# Patient Record
Sex: MALE | Race: WHITE
[De-identification: names, ages, dates, MRNs, and addresses within clinical notes are randomized per-mention and may not be internally consistent; named-entity substitution may affect disease eponyms.]

---

## 2018-06-05 ENCOUNTER — HOSPITAL ENCOUNTER (EMERGENCY)
Dept: HOSPITAL 62 - ER | Age: 23
Discharge: HOME | End: 2018-06-05
Payer: OTHER GOVERNMENT

## 2018-06-05 VITALS — SYSTOLIC BLOOD PRESSURE: 108 MMHG | DIASTOLIC BLOOD PRESSURE: 80 MMHG

## 2018-06-05 DIAGNOSIS — F17.200: ICD-10-CM

## 2018-06-05 DIAGNOSIS — S46.212A: Primary | ICD-10-CM
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
## 2018-06-05 PROCEDURE — 73060 X-RAY EXAM OF HUMERUS: CPT

## 2018-06-05 PROCEDURE — 96372 THER/PROPH/DIAG INJ SC/IM: CPT

## 2018-06-05 PROCEDURE — 99283 EMERGENCY DEPT VISIT LOW MDM: CPT

## 2018-06-05 PROCEDURE — 73080 X-RAY EXAM OF ELBOW: CPT

## 2018-06-05 NOTE — RADIOLOGY REPORT (SQ)
EXAM DESCRIPTION:  HUMERUS LEFT



COMPLETED DATE/TIME:  6/5/2018 4:48 pm



REASON FOR STUDY:  left humerus pain after fall



COMPARISON:  None.



NUMBER OF VIEWS:  Two views.



TECHNIQUE:  Two radiographic images were acquired of the left humerus to include elbow and shoulder i
n at least one projection.



LIMITATIONS:  None.



FINDINGS:  MINERALIZATION: Normal.

BONES: No acute fracture or dislocation.  No worrisome bone lesions.

SOFT TISSUES: No obvious swelling or foreign body.

OTHER: No other significant finding.



IMPRESSION:  NEGATIVE STUDY OF THE LEFT HUMERUS. NO RADIOGRAPHIC EVIDENCE OF ACUTE INJURY.



TECHNICAL DOCUMENTATION:  JOB ID:  [id number]

 2011 [employer]- All Rights Reserved



Reading location - IP/workstation name: [facility]-[email or web address]

## 2018-06-05 NOTE — ER DOCUMENT REPORT
HPI





- HPI


Pain Level: 5


Context: 





Patient is a 22-year-old male who presents emergency department with chief 

complaint of left arm injury.  Patient [place] on an outstretched hand with a pop and pain at the 

distal end of the left bicep.  Patient states that he definitely has pain with 

arm flexion and extension.  He denies any pain medication prior to arrival.  

Denies any numbness or tingling distal to his elbow





- DERM


Skin Color: Normal, Pink





Past Medical History





- Social History


Smoking Status: Current Every Day Smoker


Chew tobacco use (# tins/day): No


Frequency of alcohol use: daily


Drug Abuse: Marijuana


Family History: Reviewed & Not Pertinent


Patient has suicidal ideation: No


Patient has homicidal ideation: No


Renal/ Medical History: Denies: Hx Peritoneal Dialysis





Vertical Provider Document





- CONSTITUTIONAL


Agree With Documented VS: Yes


Notes: 


PHYSICAL EXAM


GENERAL: Alert, interacts well. 


EXTREMITIES: No edema, radial pulses 2/4 bilaterally. No cyanosis. Loss of left 

biceps muscle tone and shape, tender to palpation over the left mid arm and 

antecubital fossa, Neurovascular intact distally


NEUROLOGICAL: Alert and oriented x4. Normal speech.


PSYCH: Normal affect, normal mood.


SKIN: Warm, dry, normal turgor. No rashes or lesions noted.














- INFECTION CONTROL


TRAVEL OUTSIDE OF THE [place] IN LAST 30 DAYS: No





Course





- Re-evaluation


Re-evalutation: 





06/05/18 18:04


Patient is a 22-year-old male who is hemodynamically stable, no acute distress 

afebrile.  Presentation is consistent with a biceps tendon rupture.  there is 

no no evidence of a septic joint, gout flare, dislocation, or fracture on exam 

and imaging. Vitals wnl. At this time, I do not see an indication for labs or 

further imaging. Will discharge with conservative measures, return precautions, 

and follow-up recommendations.





- Vital Signs


Vital signs: 


 











Temp Pulse Resp BP Pulse Ox


 


 98.3 F   101 H  22 H  140/72 H  96 


 


 06/05/18 16:11  06/05/18 16:11  06/05/18 16:11  06/05/18 16:11  06/05/18 16:11














- Diagnostic Test


Radiology reviewed: Image reviewed, Reports reviewed





Procedures





- Immobilization


  ** Left Arm


Pre-Proc Neuro Vasc Exam: Normal


Immobilizer type: Ace wrap, Sling


Performed by: PCT


Post-Proc Neuro Vasc Exam: Normal





Discharge





- Discharge


Clinical Impression: 


 Biceps tendon tear





Condition: Good


Disposition: HOME, SELF-CARE


Instructions:  Tendon Laceration Referral (OM)


Additional Instructions: 


Please utilize the ace wrap to immobilize your bicep, follow up with ortho


Prescriptions: 


Docusate Sodium [Colace] 100 mg PO BID #20 capsule


Oxycodone HCl/Acetaminophen [Percocet 5-325 mg Tablet] 1 - 2 tab PO Q4H PRN #25 

tablet


 PRN Reason: 


Forms:  Return to Work


Referrals: 


ALIDA PATHAK DO [ACTIVE STAFF] - Follow up tomorrow

## 2018-06-05 NOTE — RADIOLOGY REPORT (SQ)
EXAM DESCRIPTION:  ELBOW LEFT OVER 2 VIEWS



COMPLETED DATE/TIME:  6/5/2018 4:20 pm



REASON FOR STUDY:  Pain after fall



COMPARISON:  None.



NUMBER OF VIEWS:  Four views.



TECHNIQUE:  AP, lateral, and both oblique radiographic images acquired of the left elbow.



LIMITATIONS:  None.



FINDINGS:  MINERALIZATION: Normal.

BONES: No acute fracture or dislocation.  No worrisome bone lesions.

JOINT: No effusion.

SOFT TISSUES: No soft tissue swelling.  No foreign body.

OTHER: No other significant finding.



IMPRESSION:  NEGATIVE STUDY OF THE LEFT ELBOW. NO RADIOGRAPHIC EVIDENCE OF ACUTE INJURY.



TECHNICAL DOCUMENTATION:  JOB ID:  [id number]

 2011 [employer]- All Rights Reserved



Reading location - IP/workstation name: [email or web address]

## 2018-06-05 NOTE — ER DOCUMENT REPORT
ED Medical Screen (RME)





- General


Chief Complaint: Elbow Injury


Stated Complaint: FALL/LEFT ELBOW PAIN


Time Seen by Provider: 06/05/18 16:18


Notes: 





RAPID MEDICAL EVALUATION DISCLOSURE


I have seen this patient as part of a Rapid Medical Evaluation and, if 

applicable, placed any initially appropriate orders. The patient will be seen 

and fully evaluated, including a full history and physical exam, by a provider (

in Main ED or Fast Track) when a room becomes available.





------------------------------------------------------------------





22-year-old male here with complaints of left arm and elbow pain that started 

just prior to arrival after he was playing football and fell onto his left 

elbow and "heard a pop".  EMS wanted to take him to [facility] 

however he wanted to come here instead.  Pain is worse with movement.  Pain is 

improved with minimizing movement.  He is taken to ibuprofen with minimal 

relief.  He denies any numbness tingling weakness.





EXAM


Loss of left biceps muscle tone and shape


TTP of mid left arm


Neurovascular intact distally








TRAVEL OUTSIDE OF THE [place] IN LAST 30 DAYS: No





- Related Data


Allergies/Adverse Reactions: 


 





No Known Allergies Allergy (Unverified 03/29/11 13:37)


 











Physical Exam





- Vital signs


Vitals: 





 











Temp Pulse Resp BP Pulse Ox


 


 98.3 F   101 H  22 H  140/72 H  96 


 


 06/05/18 16:11  06/05/18 16:11  06/05/18 16:11  06/05/18 16:11  06/05/18 16:11














Course





- Vital Signs


Vital signs: 





 











Temp Pulse Resp BP Pulse Ox


 


 98.3 F   101 H  22 H  140/72 H  96 


 


 06/05/18 16:11  06/05/18 16:11  06/05/18 16:11  06/05/18 16:11  06/05/18 16:11

## 2018-07-29 ENCOUNTER — HOSPITAL ENCOUNTER (EMERGENCY)
Dept: HOSPITAL 62 - ER | Age: 23
Discharge: HOME | End: 2018-07-29
Payer: SELF-PAY

## 2018-07-29 VITALS — DIASTOLIC BLOOD PRESSURE: 77 MMHG | SYSTOLIC BLOOD PRESSURE: 133 MMHG

## 2018-07-29 DIAGNOSIS — F17.210: ICD-10-CM
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
## 2018-07-29 DIAGNOSIS — Z71.6: ICD-10-CM

## 2018-07-29 DIAGNOSIS — Z23: ICD-10-CM

## 2018-07-29 DIAGNOSIS — S01.81XA: Primary | ICD-10-CM

## 2018-07-29 PROCEDURE — 90715 TDAP VACCINE 7 YRS/> IM: CPT

## 2018-07-29 PROCEDURE — 99406 BEHAV CHNG SMOKING 3-10 MIN: CPT

## 2018-07-29 PROCEDURE — 99282 EMERGENCY DEPT VISIT SF MDM: CPT

## 2018-07-29 PROCEDURE — 90471 IMMUNIZATION ADMIN: CPT

## 2018-07-29 NOTE — ER DOCUMENT REPORT
HPI





- HPI


Patient complains to provider of: Laceration to chin


Onset: Other - Saturday morning at 330


Onset/Duration: Persistent


Quality of pain: Achy


Severity: Moderate


Pain Level: 2


Associated Symptoms: Other - Laceration to chin


Exacerbated by: Denies


Relieved by: Denies


Similar symptoms previously: No


Recently seen / treated by doctor: No





- CONSTITUTIONAL


Constitutional: DENIES: Fever, Chills





- EENT


EENT: DENIES: Sore Throat, Ear Pain, Nasal Drainage-Clear, Nasal Drainage-

Purulent, Congestion, Eye problems





- NEURO


Neurology: DENIES: Headache, Weakness, Vision blurred, Dizzinesss / Vertigo





- CARDIOVASCULAR


Cardiovascular: DENIES: Chest pain





- RESPIRATORY


Respiratory: DENIES: Trouble Breathing, Coughing





- URINARY


Urinary: DENIES: Dysuria, Urgency, Frequency





- REPRODUCTIVE


Reproductive: DENIES: Pregnant:, Postmenopausal, Abnormal bleeding / discharge





- MUSCULOSKELETAL


Musculoskeletal: DENIES: Extremity pain, Back Pain, Neck Pain, Swelling





- DERM


Skin Color: Normal


Skin Problems: Laceration - Chin





Past Medical History





- General


Information source: Patient





- Social History


Smoking Status: Current Every Day Smoker


Cigarette use (# per day): Yes - PPD


Chew tobacco use (# tins/day): No


Smoking Education Provided: Yes - 4 min


Frequency of alcohol use: Heavy


Drug Abuse: None


Occupation: 


Lives with: Friend


Family History: Reviewed & Not Pertinent


Patient has suicidal ideation: No


Patient has homicidal ideation: No





- Past Medical History


Cardiac Medical History: Reports: None


Pulmonary Medical History: Reports: None


EENT Medical History: Reports: None


Neurological Medical History: Reports: None


Endocrine Medical History: Reports: None


Renal/ Medical History: Reports: None


Malignancy Medical History: Reports None


GI Medical History: Reports: None


Musculoskeletal Medical History: Reports None


Skin Medical History: Reports None


Psychiatric Medical History: Reports: None, Other


Traumatic Medical History: Reports: None


Infectious Medical History: Reports: None


Surgical Hx: Negative


Past Surgical History: Reports: None





- Immunizations


Immunizations up to date: Yes


Hx Diphtheria, Pertussis, Tetanus Vaccination: Yes - 7/29/18





Vertical Provider Document





- CONSTITUTIONAL


Agree With Documented VS: Yes


Exam Limitations: No Limitations


General Appearance: WD/WN





- INFECTION CONTROL


TRAVEL OUTSIDE OF THE [place] IN LAST 30 DAYS: No





- HEENT


HEENT: Normal ENT Exam, PERRLA.  negative: Atraumatic - Laceration to chin 

Saturday morning to old to suture





- NECK


Neck: Normal Inspection





- RESPIRATORY


Respiratory: Breath Sounds Normal, No Respiratory Distress, Chest Non-Tender





- CARDIOVASCULAR


Cardiovascular: Regular Rate, Regular Rhythm





- MUSCULOSKELETAL/EXTREMETIES


Musculoskeletal/Extremeties: MAEW, FROM, Non-Tender





- NEURO


Level of Consciousness: Awake, Alert, Appropriate


Motor/Sensory: No Motor Deficit, No Sensory Deficit, No Pronator Drift


Deep Tendon Reflexes: 2+





- DERM


Integumentary: Warm, Dry, No Rash, Laceration - Laceration to chin Saturday 

morning





Course





- Re-evaluation


Re-evalutation: 





07/29/18 11:25


Wound was cleaned well with surgical scrub rinsed with saline padded dry then 

benzoin applied and Steri-Strips.  Patient was started on Keflex and tetanus 

shot was given.  Wound is too old to suture.





- Vital Signs


Vital signs: 


 











Temp Pulse Resp BP Pulse Ox


 


 97.9 F   65   18   149/85 H  99 


 


 07/29/18 10:48  07/29/18 10:48  07/29/18 10:48  07/29/18 10:48  07/29/18 10:48














Procedures





- Laceration/Wound Repair


  ** chin


Time completed: 11:26


Wound length (cm): 5


Wound's Depth, Shape: Superficial, Irregular


Laceration pre-procedure: Sterile PPE donned, Sterile drapes applied, Shur-

Clens applied


Anesthetic type: Other


Volume Anesthetic (mLs): 0


Wound explored: No foreign body removed


Irrigated w/ Saline (mLs): 100


Wound Repaired With: Steri-strips


Post-procedure wound care: Sterile dressing applied


Post-procedure NV exam normal: Yes


Complications: Yes - Laceration was too old to suture





Discharge





- Discharge


Clinical Impression: 


Laceration of chin


Qualifiers:


 Encounter type: initial encounter Qualified Code(s): S01.81XA - Laceration 

without foreign body of other part of head, initial encounter





Disposition: HOME, SELF-CARE


Instructions:  Family Physicians / Practices


Additional Instructions: 


NON-SUTURED LACERATION:


     Your laceration did not require suturing.  Some lacerations cannot be 

sutured because of increased infection risk, while others simply don't need 

stitches because they are shallow or very short.


     Your injury should be protected while it heals.  Usually complete healing 

takes 10 to 14 days.  Keep the dressing clean and dry, and change it every day.


     If you notice increasing pain, redness, swelling, drainage, or tender 

lumps in the armpit or groin above the injury, infection may be present.  You 

should call the doctor at once.








TETANUS IMMUNIZATION GIVEN:


     You have been given an immunization against tetanus.  Please record this 

in your records.  In general, a booster is needed only once every 10 years.  

The tetanus shot protects against tetanus or "lockjaw," which is a complication 

of certain wound infections (the tetanus shot cannot protect against the actual 

infection).


     The immunization site may become warm and red due to local reaction.  If 

this occurs, apply warm compresses and take aspirin or ibuprofen to reduce 

inflammation and discomfort.  Return for evaluation if the reaction becomes 

severe.








PROPHYLACTIC ANTIBIOTIC:


     The antibiotics which have been prescribed are designed to decrease the 

risk of infection.  Only certain types of wounds benefit from this -- the 

typical cut, scrape, or burn DOES NOT require antibiotics.  Of course, 

infection can still occur despite the use of prophylactic antibiotics.


     Your wound will heal with less chance of an infectious complication if you 

take the medication as directed.  The most important dose is the FIRST dose, so 

don't delay filling the prescription!





Care of Steri-Strip Closure





     Your cut has been closed up with a special surgical tape.  For this type 

of cut, it can replace stitches.  You must protect the wound just as you would 

with stitches, however.


     For the first few days, keep the wound area completely dry.  This also 

means you should avoid activity which makes you sweat.  Do not move the area if 

motion stretches or wrinkles the strips.  Don't allow the area to be bumped -- 

if bleeding occurs, the blood can make the strips loosen.


     The strips are somewhat waterproof.  After a few days, the physician may 

allow you to shower.  Be sure to ask if it's OK.


     Do not remove the tape until it peels off by itself.  At that time, the 

wound should be healed.








FOLLOW-UP CARE:


     Please see primary doctor in __3___ days for an infection check and 

dressing change.





     If you have been referred to another physician for follow-up care, call 

that physicians office for an appointment as you were instructed.  If you 

experience a significant change in your laceration, or if you are concerned 

there may be an infection (swelling, redness, drainage, increasing tenderness, 

red streaks, tender lumps in the armpit or groin above the laceration, or fever)

, return to the Emergency Department immediately re-evaluation.








Prescriptions: 


Cephalexin Monohydrate [Keflex 500 mg Capsule] 500 mg PO Q6H 5 Days  capsule


Forms:  Elevated Blood Pressure, Special Work Note, Smoking Cessation Education

, Return to Work

## 2018-12-02 ENCOUNTER — HOSPITAL ENCOUNTER (EMERGENCY)
Dept: HOSPITAL 62 - ER | Age: 23
Discharge: HOME | End: 2018-12-02
Payer: SELF-PAY

## 2018-12-02 VITALS — DIASTOLIC BLOOD PRESSURE: 81 MMHG | SYSTOLIC BLOOD PRESSURE: 126 MMHG

## 2018-12-02 DIAGNOSIS — R05: ICD-10-CM

## 2018-12-02 DIAGNOSIS — B97.89: ICD-10-CM

## 2018-12-02 DIAGNOSIS — J01.90: Primary | ICD-10-CM

## 2018-12-02 DIAGNOSIS — F17.210: ICD-10-CM

## 2018-12-02 PROCEDURE — 99283 EMERGENCY DEPT VISIT LOW MDM: CPT

## 2019-06-21 ENCOUNTER — HOSPITAL ENCOUNTER (EMERGENCY)
Dept: HOSPITAL 62 - ER | Age: 24
Discharge: HOME | End: 2019-06-21
Payer: COMMERCIAL

## 2019-06-21 VITALS — DIASTOLIC BLOOD PRESSURE: 79 MMHG | SYSTOLIC BLOOD PRESSURE: 126 MMHG

## 2019-06-21 DIAGNOSIS — R07.89: Primary | ICD-10-CM
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
## 2019-06-21 PROCEDURE — 99283 EMERGENCY DEPT VISIT LOW MDM: CPT
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
